# Patient Record
Sex: FEMALE | Employment: FULL TIME | URBAN - METROPOLITAN AREA
[De-identification: names, ages, dates, MRNs, and addresses within clinical notes are randomized per-mention and may not be internally consistent; named-entity substitution may affect disease eponyms.]

---

## 2017-07-12 ENCOUNTER — CLINICAL SUPPORT (OUTPATIENT)
Dept: OCCUPATIONAL MEDICINE | Facility: CLINIC | Age: 47
End: 2017-07-12

## 2017-07-12 DIAGNOSIS — Z02.1 PHYSICAL EXAM, PRE-EMPLOYMENT: Primary | ICD-10-CM

## 2017-07-12 PROCEDURE — 99499 UNLISTED E&M SERVICE: CPT | Mod: S$GLB,,, | Performed by: NURSE PRACTITIONER

## 2021-07-30 LAB
HUMAN PAPILLOMAVIRUS (HPV): NORMAL
PAP RECOMMENDATION EXT: NORMAL
PAP SMEAR: NORMAL

## 2021-08-11 LAB — BCS RECOMMENDATION EXT: NORMAL

## 2022-09-29 ENCOUNTER — DOCUMENTATION ONLY (OUTPATIENT)
Dept: FAMILY MEDICINE | Facility: CLINIC | Age: 52
End: 2022-09-29

## 2024-11-06 ENCOUNTER — HOSPITAL ENCOUNTER (EMERGENCY)
Facility: HOSPITAL | Age: 54
Discharge: HOME | End: 2024-11-07
Attending: EMERGENCY MEDICINE
Payer: MEDICARE

## 2024-11-06 DIAGNOSIS — R07.9 CHEST PAIN, UNSPECIFIED TYPE: ICD-10-CM

## 2024-11-06 DIAGNOSIS — R07.9 CHEST PAIN: ICD-10-CM

## 2024-11-06 DIAGNOSIS — F12.90 MARIJUANA USE: ICD-10-CM

## 2024-11-06 DIAGNOSIS — R41.82 ALTERED MENTAL STATUS, UNSPECIFIED ALTERED MENTAL STATUS TYPE: Primary | ICD-10-CM

## 2024-11-06 LAB
ALBUMIN SERPL BCP-MCNC: 3.9 G/DL (ref 3.5–5.2)
ALLENS TEST: ABNORMAL
ALP SERPL-CCNC: 69 U/L (ref 40–150)
ALT SERPL W/O P-5'-P-CCNC: 20 U/L (ref 10–44)
AMPHET+METHAMPHET UR QL: NEGATIVE
ANION GAP SERPL CALC-SCNC: 10 MMOL/L (ref 8–16)
APAP SERPL-MCNC: <3 UG/ML (ref 10–20)
AST SERPL-CCNC: 24 U/L (ref 10–40)
BACTERIA #/AREA URNS HPF: ABNORMAL /HPF
BARBITURATES UR QL SCN>200 NG/ML: NEGATIVE
BASOPHILS # BLD AUTO: 0.02 K/UL (ref 0–0.2)
BASOPHILS NFR BLD: 0.4 % (ref 0–1.9)
BENZODIAZ UR QL SCN>200 NG/ML: NEGATIVE
BILIRUB SERPL-MCNC: 0.8 MG/DL (ref 0.1–1)
BILIRUB UR QL STRIP: NEGATIVE
BNP SERPL-MCNC: 38 PG/ML (ref 0–99)
BUN SERPL-MCNC: 9 MG/DL (ref 6–20)
BZE UR QL SCN: NEGATIVE
CALCIUM SERPL-MCNC: 9.5 MG/DL (ref 8.7–10.5)
CANNABINOIDS UR QL SCN: ABNORMAL
CHLORIDE SERPL-SCNC: 109 MMOL/L (ref 95–110)
CLARITY UR: ABNORMAL
CO2 SERPL-SCNC: 23 MMOL/L (ref 23–29)
COLOR UR: ABNORMAL
CREAT SERPL-MCNC: 0.9 MG/DL (ref 0.5–1.4)
CREAT UR-MCNC: 188.4 MG/DL (ref 15–325)
DIFFERENTIAL METHOD BLD: ABNORMAL
EOSINOPHIL # BLD AUTO: 0.3 K/UL (ref 0–0.5)
EOSINOPHIL NFR BLD: 5.7 % (ref 0–8)
ERYTHROCYTE [DISTWIDTH] IN BLOOD BY AUTOMATED COUNT: 13.9 % (ref 11.5–14.5)
EST. GFR  (NO RACE VARIABLE): >60 ML/MIN/1.73 M^2
ETHANOL SERPL-MCNC: <10 MG/DL
ETHANOL SERPL-MCNC: <10 MG/DL
GLUCOSE SERPL-MCNC: 99 MG/DL (ref 70–110)
GLUCOSE UR QL STRIP: NEGATIVE
HCO3 UR-SCNC: 25.4 MMOL/L (ref 24–28)
HCT VFR BLD AUTO: 40 % (ref 37–48.5)
HGB BLD-MCNC: 12.2 G/DL (ref 12–16)
HGB UR QL STRIP: ABNORMAL
HYALINE CASTS #/AREA URNS LPF: ABNORMAL /LPF
IMM GRANULOCYTES # BLD AUTO: 0.01 K/UL (ref 0–0.04)
IMM GRANULOCYTES NFR BLD AUTO: 0.2 % (ref 0–0.5)
KETONES UR QL STRIP: NEGATIVE
LEUKOCYTE ESTERASE UR QL STRIP: ABNORMAL
LYMPHOCYTES # BLD AUTO: 2 K/UL (ref 1–4.8)
LYMPHOCYTES NFR BLD: 35.6 % (ref 18–48)
MCH RBC QN AUTO: 26 PG (ref 27–31)
MCHC RBC AUTO-ENTMCNC: 30.5 G/DL (ref 32–36)
MCV RBC AUTO: 85 FL (ref 82–98)
METHADONE UR QL SCN>300 NG/ML: NEGATIVE
MICROSCOPIC COMMENT: ABNORMAL
MONOCYTES # BLD AUTO: 0.6 K/UL (ref 0.3–1)
MONOCYTES NFR BLD: 11.3 % (ref 4–15)
NEUTROPHILS # BLD AUTO: 2.6 K/UL (ref 1.8–7.7)
NEUTROPHILS NFR BLD: 46.8 % (ref 38–73)
NITRITE UR QL STRIP: NEGATIVE
NRBC BLD-RTO: 0 /100 WBC
OPIATES UR QL SCN: NEGATIVE
PCO2 BLDA: 42.4 MMHG (ref 35–45)
PCP UR QL SCN>25 NG/ML: NEGATIVE
PH SMN: 7.39 [PH] (ref 7.35–7.45)
PH UR STRIP: 6 [PH] (ref 5–8)
PLATELET # BLD AUTO: 201 K/UL (ref 150–450)
PMV BLD AUTO: 10.4 FL (ref 9.2–12.9)
PO2 BLDA: 64 MMHG (ref 40–60)
POC BE: 0 MMOL/L
POC SATURATED O2: 92 % (ref 95–100)
POC TCO2: 27 MMOL/L (ref 24–29)
POTASSIUM SERPL-SCNC: 3.3 MMOL/L (ref 3.5–5.1)
PROT SERPL-MCNC: 7.8 G/DL (ref 6–8.4)
PROT UR QL STRIP: ABNORMAL
RBC # BLD AUTO: 4.7 M/UL (ref 4–5.4)
RBC #/AREA URNS HPF: >100 /HPF (ref 0–4)
SAMPLE: ABNORMAL
SITE: ABNORMAL
SODIUM SERPL-SCNC: 142 MMOL/L (ref 136–145)
SP GR UR STRIP: 1.01 (ref 1–1.03)
SQUAMOUS #/AREA URNS HPF: 8 /HPF
TOXICOLOGY INFORMATION: ABNORMAL
TROPONIN I SERPL DL<=0.01 NG/ML-MCNC: <0.006 NG/ML (ref 0–0.03)
TSH SERPL DL<=0.005 MIU/L-ACNC: 0.65 UIU/ML (ref 0.4–4)
URN SPEC COLLECT METH UR: ABNORMAL
UROBILINOGEN UR STRIP-ACNC: NEGATIVE EU/DL
WBC # BLD AUTO: 5.47 K/UL (ref 3.9–12.7)
WBC #/AREA URNS HPF: 1 /HPF (ref 0–5)

## 2024-11-06 PROCEDURE — 80143 DRUG ASSAY ACETAMINOPHEN: CPT | Performed by: EMERGENCY MEDICINE

## 2024-11-06 PROCEDURE — 83880 ASSAY OF NATRIURETIC PEPTIDE: CPT | Performed by: EMERGENCY MEDICINE

## 2024-11-06 PROCEDURE — 82077 ASSAY SPEC XCP UR&BREATH IA: CPT | Performed by: EMERGENCY MEDICINE

## 2024-11-06 PROCEDURE — 80307 DRUG TEST PRSMV CHEM ANLYZR: CPT | Performed by: EMERGENCY MEDICINE

## 2024-11-06 PROCEDURE — 82803 BLOOD GASES ANY COMBINATION: CPT

## 2024-11-06 PROCEDURE — 81000 URINALYSIS NONAUTO W/SCOPE: CPT | Mod: 59 | Performed by: EMERGENCY MEDICINE

## 2024-11-06 PROCEDURE — 93010 ELECTROCARDIOGRAM REPORT: CPT | Mod: ,,, | Performed by: INTERNAL MEDICINE

## 2024-11-06 PROCEDURE — 85025 COMPLETE CBC W/AUTO DIFF WBC: CPT | Performed by: EMERGENCY MEDICINE

## 2024-11-06 PROCEDURE — 84484 ASSAY OF TROPONIN QUANT: CPT | Performed by: EMERGENCY MEDICINE

## 2024-11-06 PROCEDURE — 93005 ELECTROCARDIOGRAM TRACING: CPT

## 2024-11-06 PROCEDURE — 99900035 HC TECH TIME PER 15 MIN (STAT)

## 2024-11-06 PROCEDURE — 96374 THER/PROPH/DIAG INJ IV PUSH: CPT

## 2024-11-06 PROCEDURE — 63600175 PHARM REV CODE 636 W HCPCS: Performed by: EMERGENCY MEDICINE

## 2024-11-06 PROCEDURE — 99285 EMERGENCY DEPT VISIT HI MDM: CPT | Mod: 25

## 2024-11-06 PROCEDURE — 80053 COMPREHEN METABOLIC PANEL: CPT | Performed by: EMERGENCY MEDICINE

## 2024-11-06 PROCEDURE — 84443 ASSAY THYROID STIM HORMONE: CPT | Performed by: EMERGENCY MEDICINE

## 2024-11-06 PROCEDURE — 96375 TX/PRO/DX INJ NEW DRUG ADDON: CPT

## 2024-11-06 RX ORDER — ONDANSETRON HYDROCHLORIDE 2 MG/ML
4 INJECTION, SOLUTION INTRAVENOUS
Status: COMPLETED | OUTPATIENT
Start: 2024-11-06 | End: 2024-11-06

## 2024-11-06 RX ADMIN — ONDANSETRON 4 MG: 2 INJECTION INTRAMUSCULAR; INTRAVENOUS at 09:11

## 2024-11-07 ENCOUNTER — HOSPITAL ENCOUNTER (EMERGENCY)
Facility: HOSPITAL | Age: 54
Discharge: LEFT BEFORE DISCHARGE | End: 2024-11-07
Attending: EMERGENCY MEDICINE
Payer: MEDICARE

## 2024-11-07 VITALS
SYSTOLIC BLOOD PRESSURE: 183 MMHG | HEART RATE: 93 BPM | HEIGHT: 64 IN | RESPIRATION RATE: 20 BRPM | OXYGEN SATURATION: 96 % | DIASTOLIC BLOOD PRESSURE: 85 MMHG | TEMPERATURE: 98 F | WEIGHT: 270 LBS | BODY MASS INDEX: 46.1 KG/M2

## 2024-11-07 VITALS
RESPIRATION RATE: 16 BRPM | TEMPERATURE: 98 F | OXYGEN SATURATION: 97 % | DIASTOLIC BLOOD PRESSURE: 79 MMHG | HEART RATE: 99 BPM | SYSTOLIC BLOOD PRESSURE: 175 MMHG | WEIGHT: 268.94 LBS | BODY MASS INDEX: 42.21 KG/M2 | HEIGHT: 67 IN

## 2024-11-07 DIAGNOSIS — I10 HYPERTENSION: ICD-10-CM

## 2024-11-07 DIAGNOSIS — R44.3 HALLUCINATIONS: Primary | ICD-10-CM

## 2024-11-07 DIAGNOSIS — F43.21 GRIEF REACTION: ICD-10-CM

## 2024-11-07 LAB
ALBUMIN SERPL-MCNC: 4.1 G/DL (ref 3.3–5.5)
ALBUMIN SERPL-MCNC: 4.2 G/DL (ref 3.3–5.5)
ALP SERPL-CCNC: 56 U/L (ref 42–141)
ALP SERPL-CCNC: 65 U/L (ref 42–141)
BILIRUB SERPL-MCNC: 0.7 MG/DL (ref 0.2–1.6)
BILIRUB SERPL-MCNC: 0.8 MG/DL (ref 0.2–1.6)
BUN SERPL-MCNC: 10 MG/DL (ref 7–22)
CALCIUM SERPL-MCNC: 9.8 MG/DL (ref 8–10.3)
CHLORIDE SERPL-SCNC: 107 MMOL/L (ref 98–108)
CREAT SERPL-MCNC: 0.9 MG/DL (ref 0.6–1.2)
GLUCOSE SERPL-MCNC: 118 MG/DL (ref 73–118)
OHS QRS DURATION: 120 MS
OHS QTC CALCULATION: 523 MS
POC ALT (SGPT): 18 U/L (ref 10–47)
POC ALT (SGPT): 18 U/L (ref 10–47)
POC AMYLASE: 45 U/L (ref 14–97)
POC AST (SGOT): 30 U/L (ref 11–38)
POC AST (SGOT): 32 U/L (ref 11–38)
POC B-TYPE NATRIURETIC PEPTIDE: 156 PG/ML (ref 0–100)
POC CARDIAC TROPONIN I: 0 NG/ML (ref 0–0.08)
POC GGT: 32 U/L (ref 5–65)
POC TCO2: 25 MMOL/L (ref 18–33)
POTASSIUM BLD-SCNC: 3.5 MMOL/L (ref 3.6–5.1)
PROTEIN, POC: 7.4 G/DL (ref 6.4–8.1)
PROTEIN, POC: 7.8 G/DL (ref 6.4–8.1)
SAMPLE: NORMAL
SODIUM BLD-SCNC: 141 MMOL/L (ref 128–145)

## 2024-11-07 PROCEDURE — 99283 EMERGENCY DEPT VISIT LOW MDM: CPT | Mod: ER

## 2024-11-07 PROCEDURE — 84484 ASSAY OF TROPONIN QUANT: CPT | Mod: ER

## 2024-11-07 PROCEDURE — 82040 ASSAY OF SERUM ALBUMIN: CPT | Mod: ER

## 2024-11-07 PROCEDURE — 83880 ASSAY OF NATRIURETIC PEPTIDE: CPT | Mod: ER

## 2024-11-07 PROCEDURE — 80053 COMPREHEN METABOLIC PANEL: CPT | Mod: ER

## 2024-11-07 PROCEDURE — 82803 BLOOD GASES ANY COMBINATION: CPT | Mod: ER

## 2024-11-07 PROCEDURE — 63600175 PHARM REV CODE 636 W HCPCS: Performed by: STUDENT IN AN ORGANIZED HEALTH CARE EDUCATION/TRAINING PROGRAM

## 2024-11-07 RX ORDER — QUETIAPINE FUMARATE 50 MG/1
1 TABLET, FILM COATED ORAL NIGHTLY
COMMUNITY
Start: 2024-10-30 | End: 2024-11-29

## 2024-11-07 RX ORDER — BUPROPION HYDROCHLORIDE 300 MG/1
300 TABLET ORAL EVERY MORNING
COMMUNITY
Start: 2024-10-04

## 2024-11-07 RX ORDER — FAMOTIDINE 10 MG/ML
40 INJECTION INTRAVENOUS
Status: DISCONTINUED | OUTPATIENT
Start: 2024-11-07 | End: 2024-11-07 | Stop reason: HOSPADM

## 2024-11-07 RX ORDER — CARVEDILOL 25 MG/1
25 TABLET ORAL 2 TIMES DAILY
COMMUNITY

## 2024-11-07 RX ORDER — LOSARTAN POTASSIUM 100 MG/1
1 TABLET ORAL DAILY
COMMUNITY
Start: 2024-01-04 | End: 2025-01-03

## 2024-11-07 RX ORDER — FAMOTIDINE 20 MG/1
1 TABLET, FILM COATED ORAL 2 TIMES DAILY
COMMUNITY
Start: 2023-12-05 | End: 2024-12-04

## 2024-11-07 RX ORDER — DROPERIDOL 2.5 MG/ML
1.25 INJECTION, SOLUTION INTRAMUSCULAR; INTRAVENOUS ONCE
Status: DISCONTINUED | OUTPATIENT
Start: 2024-11-07 | End: 2024-11-07

## 2024-11-07 RX ORDER — SPIRONOLACTONE 25 MG/1
1 TABLET ORAL DAILY
COMMUNITY
Start: 2023-12-21

## 2024-11-07 RX ORDER — ONDANSETRON HYDROCHLORIDE 2 MG/ML
8 INJECTION, SOLUTION INTRAVENOUS
Status: DISCONTINUED | OUTPATIENT
Start: 2024-11-07 | End: 2024-11-07 | Stop reason: HOSPADM

## 2024-11-07 RX ORDER — LORAZEPAM 2 MG/ML
1 INJECTION INTRAMUSCULAR
Status: COMPLETED | OUTPATIENT
Start: 2024-11-07 | End: 2024-11-07

## 2024-11-07 RX ADMIN — LORAZEPAM 1 MG: 2 INJECTION INTRAMUSCULAR; INTRAVENOUS at 02:11

## 2024-11-07 NOTE — ED PROVIDER NOTES
Encounter Date: 2024       History     Chief Complaint   Patient presents with    Chest Pain     Pt reported to ED with a CC of CP. PT reported that she felt her defibrillator go off, and believed that she was having a heart attack.     HPI    54-year-old female past medical history of hypertension, depression presents with chest pain since earlier today.  Patient reports being at the hairdresser when she felt like she was getting shocked by her defibrillator.  She reports feeling like she is having a heart attack.  She reports having no headache.  Son reports that she saw her psychiatrist earlier this morning and was told to double her Seroquel dosing.  She reports taking this earlier this morning.  Son reports that she was talking like normal and acting normally earlier today.  She reports her son's  is tomorrow morning at 10:00 a.m..  She wants to have a stethoscope placed on her chest as she reports having a heart attack currently.  She denies any headache, shortness of breath, or any further complaints.    Review of patient's allergies indicates:  No Known Allergies  Past Medical History:   Diagnosis Date    Hypertension      Past Surgical History:   Procedure Laterality Date    KNEE SURGERY      NASAL SEPTUM SURGERY      TONSILLECTOMY       Family History   Problem Relation Name Age of Onset    Breast cancer Mother      Colon cancer Neg Hx      Ovarian cancer Neg Hx       Social History     Tobacco Use    Smoking status: Never    Smokeless tobacco: Never     Review of Systems   Constitutional: Negative.    HENT: Negative.     Eyes: Negative.    Respiratory: Negative.     Cardiovascular:  Positive for chest pain.   Gastrointestinal: Negative.    Genitourinary: Negative.    Musculoskeletal: Negative.    Skin: Negative.    Neurological: Negative.    Psychiatric/Behavioral:  Positive for agitation.        Physical Exam     Initial Vitals [24 1924]   BP Pulse Resp Temp SpO2   (!) 184/90 90 18 97.3  °F (36.3 °C) 99 %      MAP       --         Physical Exam    Nursing note and vitals reviewed.  Constitutional: She is not diaphoretic. She appears distressed (moderately, moving all over the bed, yelling).   HENT:   Head: Normocephalic and atraumatic.   Nose: Nose normal.   Eyes: EOM are normal. Pupils are equal, round, and reactive to light.   Neck: Neck supple. No JVD present.   Normal range of motion.  Cardiovascular:  Regular rhythm, normal heart sounds and intact distal pulses.           Tachycardic   Pulmonary/Chest: No stridor. She is in respiratory distress (Mild tachypnea). She has no wheezes. She has no rhonchi. She has no rales.   AICD to left chest wall   Abdominal: Abdomen is soft. Bowel sounds are normal. She exhibits no distension. There is no abdominal tenderness.   Musculoskeletal:         General: No tenderness or edema. Normal range of motion.      Cervical back: Normal range of motion and neck supple.     Neurological: She is alert and oriented to person, place, and time. She has normal strength.   Skin: Skin is warm and dry. Capillary refill takes less than 2 seconds. No rash noted. No erythema.         ED Course   Procedures  Labs Reviewed   CBC W/ AUTO DIFFERENTIAL - Abnormal       Result Value    WBC 5.47      RBC 4.70      Hemoglobin 12.2      Hematocrit 40.0      MCV 85      MCH 26.0 (*)     MCHC 30.5 (*)     RDW 13.9      Platelets 201      MPV 10.4      Immature Granulocytes 0.2      Gran # (ANC) 2.6      Immature Grans (Abs) 0.01      Lymph # 2.0      Mono # 0.6      Eos # 0.3      Baso # 0.02      nRBC 0      Gran % 46.8      Lymph % 35.6      Mono % 11.3      Eosinophil % 5.7      Basophil % 0.4      Differential Method Automated     COMPREHENSIVE METABOLIC PANEL - Abnormal    Sodium 142      Potassium 3.3 (*)     Chloride 109      CO2 23      Glucose 99      BUN 9      Creatinine 0.9      Calcium 9.5      Total Protein 7.8      Albumin 3.9      Total Bilirubin 0.8      Alkaline  Phosphatase 69      AST 24      ALT 20      eGFR >60      Anion Gap 10     DRUG SCREEN PANEL, URINE EMERGENCY - Abnormal    Benzodiazepines Negative      Methadone metabolites Negative      Cocaine (Metab.) Negative      Opiate Scrn, Ur Negative      Barbiturate Screen, Ur Negative      Amphetamine Screen, Ur Negative      THC Presumptive Positive (*)     Phencyclidine Negative      Creatinine, Urine 188.4      Toxicology Information SEE COMMENT      Narrative:     Specimen Source->Urine   URINALYSIS, REFLEX TO URINE CULTURE - Abnormal    Specimen UA Urine, Clean Catch      Color, UA Orange (*)     Appearance, UA Hazy (*)     pH, UA 6.0      Specific Gravity, UA 1.015      Protein, UA 1+ (*)     Glucose, UA Negative      Ketones, UA Negative      Bilirubin (UA) Negative      Occult Blood UA 3+ (*)     Nitrite, UA Negative      Urobilinogen, UA Negative      Leukocytes, UA 1+ (*)     Narrative:     Specimen Source->Urine   ACETAMINOPHEN LEVEL - Abnormal    Acetaminophen (Tylenol), Serum <3.0 (*)    URINALYSIS MICROSCOPIC - Abnormal    RBC, UA >100 (*)     WBC, UA 1      Bacteria Rare      Squam Epithel, UA 8      Hyaline Casts, UA none      Microscopic Comment SEE COMMENT      Narrative:     Specimen Source->Urine   ISTAT PROCEDURE - Abnormal    POC PH 7.386      POC PCO2 42.4      POC PO2 64 (*)     POC HCO3 25.4      POC BE 0      POC SATURATED O2 92      POC TCO2 27      Sample VENOUS      Site Other      Allens Test N/A     TROPONIN I    Troponin I <0.006     B-TYPE NATRIURETIC PEPTIDE    BNP 38     ALCOHOL,MEDICAL (ETHANOL)    Alcohol, Serum <10     TSH    TSH 0.649     ALCOHOL,MEDICAL (ETHANOL)    Alcohol, Serum <10            Imaging Results              CT Head Without Contrast (Final result)  Result time 11/06/24 23:08:18      Final result by Jaye Dumont MD (11/06/24 23:08:18)                   Impression:      No acute intracranial abnormality detected.      Electronically signed by: Jaye  Tim  Date:    11/06/2024  Time:    23:08               Narrative:    EXAMINATION:  CT OF THE HEAD WITHOUT    CLINICAL HISTORY:  Chest pain.Mental status change, unknown cause;    TECHNIQUE:  5 mm unenhanced axial images were obtained from the skull base to the vertex.    COMPARISON:  11/06/2024 22:21    FINDINGS:  The ventricles, basal cisterns, and cortical sulci are within normal limits for patient's stated age. There is no acute intracranial hemorrhage, territorial infarct or mass effect, or midline shift. The visualized paranasal sinuses and mastoid air cells are clear.  There is hyperostosis frontalis.                                       X-Ray Chest AP Portable (Final result)  Result time 11/06/24 21:57:39      Final result by Abraham Alejandro MD (11/06/24 21:57:39)                   Impression:      Cardiomegaly with suspected small bilateral pleural effusions and pulmonary vascular congestion, suggestive of mild pulmonary edema secondary to CHF.      Electronically signed by: Abraham Alejandro MD  Date:    11/06/2024  Time:    21:57               Narrative:    EXAMINATION:  XR CHEST AP PORTABLE    CLINICAL HISTORY:  Chest Pain;    TECHNIQUE:  Single frontal view of the chest was performed.    COMPARISON:  02/09/2009.    FINDINGS:  There is a multilead left-sided AICD in place.  The appearance is unremarkable.  Monitoring EKG leads are present.    The trachea is unremarkable.  The cardiac silhouette is enlarged.  There is elevation of both hemidiaphragms.  There are suspected small bilateral pleural effusions.  There is no evidence of a pneumothorax.  There is no evidence of pneumomediastinum.  There is mild pulmonary vascular congestion.  There is no focal consolidation.  There are degenerative changes in the osseous structures.                                       Medications   ondansetron injection 4 mg (4 mg Intravenous Given 11/6/24 2131)   LORazepam injection 1 mg (1 mg Intravenous Given 11/7/24 0216)      Medical Decision Making             ED Course as of 24 0420   u 2024   0018 I assumed care of this patient pending workup for altered mental status.  She initially presented complaining of chest pain feeling like she was shocked by her defibrillator.  Chest pain workup was reassuring including 2 troponins, EKG, chest x-ray, BNP and device check.  On reexamination, she was confused and then her son provided information that she was not acting like herself.  Altered mental status workup subsequently initiated just prior to my assumption of care of this patient.  On my exam, the patient is alert to self, disoriented to location and situation, following conversation but intermittently making strange gestures with horizontal nystagmus.  Vitals were within normal limits.  Brought her workup included CT head without acute intracranial abnormality, ethanol level which was normal, VBG without evidence of acidosis, UA with red blood cells and drug screen notable for marijuana.  Presentation certainly could be consistent with marijuana intoxication.  After further examination, discuss case with patient's brother, at bedside as well as her son, and they would like to take the patient home.  I attempted to ambulate the patient, but she would not walk under her own assistance, she was also unable to clearly state what happened today.  For that reason, decided to keep her in the emergency department and observe her until the morning, hopefully I would be able to discharge her into the care of her family prior to her son's  at 10:00 a.m..  Subsequently, patient had generalized tonic-clonic movement, brief apneic episode, most consistent with seizure.  She has no history of seizures.  She had mild postictal state, followed by normal blood sugar, and I will continue to monitor closely.  Patient is altered, but has no neck pain, no signs of meningitis. If she has prolonged altered mental status or another  seizure, likely will have to admit the patient to the hospital.  I explained that to the patient's brother and son, who expressed understanding.  [BS]   0416 Patient now able to ambulate without difficulty, she was clinically sober.  She endorsed taking a marijuana edible earlier, reporting she started taking them after her son has passed.  I advised that she should discontinue taking those given the current hospitalization and prolonged altered mental status.  She agreed.  Spoke to her son, who will pick her up.  She was stable for discharge with outpatient follow up and cessation of marijuana. [BS]      ED Course User Index  [BS] Pako Orr MD         MDM:    54-year-old female past medical history of hypertension, depression presents with chest pain since earlier today.  Differential Diagnosis includes:  Altered mental status intoxication, medication side-effect, grief reaction.  Physical exam as noted above.  ED workup notable for EKG showing normal sinus rhythm rate of 91 beats per minute, nonspecific ST and T-wave changes noted throughout,  MS, no STEMI.  Troponin negative, BNP 38, alcohol negative, chest x-ray is unremarkable, CMP with potassium 3.3, creatinine 0.9, CBC white blood cell count 5.47, hemoglobin 12.2.  Patient presentation initially concerning for chest pain, reporting that she got shocked.  [employer] interrogated and had no defibrillation present, no arrhythmias or any abnormalities were noted.  Patient reassessed and still appears to be verbose, slightly agitated and stating that she she is having chest pain and to check her foot.  Upon arrival of the son he reports that she was conversing like normal, acting normally earlier today prior to her going to get her hair done at the [employer].  He is unclear if she is taking any additional medications or substances.  But she is currently not acting herself.  Additional lab work was added, CT head was added, tele psychiatry  consulted.       Note was created using voice recognition software. Note may have occasional typographical or grammatical errors, garbled syntax, and other bizarre constructions that may not have been identified and edited despite good lobo initial review prior to signing.                           Clinical Impression:  Final diagnoses:  [R07.9] Chest pain  [R07.9] Chest pain, unspecified type  [F12.90] Marijuana use  [R41.82] Altered mental status, unspecified altered mental status type (Primary)                 Pako Orr MD  11/07/24 [id number]

## 2024-11-07 NOTE — ED NOTES
Pt reported to ED with a CC of CP. PT reported that she felt her defibrillator go off, and believed that she was having a heart attack.

## 2024-11-07 NOTE — ED NOTES
Upon entering room pt rigid and stiff seizure like activity, pt apneic with pulses intact.  Nonrebreather mask placed.  Episode lasted for approximately 2 minutes. POCT Glucose 123.   Nurse and PA at bedside. Pt now able to verbalize that she need a nap. Monitoring continued.

## 2024-11-07 NOTE — ED NOTES
Pt able to ambulate to restroom with assistance, in distress noted. Son at bedside willing to take her home. Pt and family educated on home care.

## 2024-11-07 NOTE — LETTER
Patient: Slade Bernal  YOB: 1970  Date: 11/7/2024 Time: 7:42 AM  Location: [facility]    Leaving the Hospital Against Medical Advice    Chart #:[id number]    This will certify that I, the undersigned,    ______________________________________________________________________    A patient in the above named medical center, having requested discharge and removal from the medical center against the advice of my attending physician(s), hereby release [facility], its physicians, officers and employees, severally and individually, from any and all liability of any nature whatsoever for any injury or harm or complication of any kind that may result directly or indirectly, by reason of my terminating my stay as a patient at [facility] and my departure from [facility], and hereby waive any and all rights of action I may now have or later acquire as a result of my voluntary departure from [facility] and the termination of my stay as a patient therein.    This release is made with the full knowledge of the danger that may result from the action which I am taking.      Date:_______________________                         ___________________________                                                                                    Patient/Legal Representative    Witness:        ____________________________                          ___________________________  Nurse                                                                        Physician

## 2024-11-07 NOTE — ED NOTES
Pt's [employer] Pacemaker/Defibrillator interpreted.  Per [employer] rep, NO SHOCKS noted, no pacing needed, and device appears to be working completely normal.  MD notified and is at bedside updating patient and family.

## 2024-11-07 NOTE — ED PROVIDER NOTES
Encounter Date: 2024    SCRIBE #1 NOTE: I, Ezekiel Khan, am scribing for, and in the presence of,  Stella Mathew DO. I have scribed the following portions of the note - Other sections scribed: HPI,ROS,PE.       History     Chief Complaint   Patient presents with    Drug Overdose    Hallucinations     PT SON REPORTS PT TOOK AN EDDIBLE LAST NIGHT, AND WAS SEEN AT [facility], WAS TREATED AND DISCHARGED, PT NOW HALLUCINATING AND NOT ACTING RIGHT PER SON. UNK IF FURTHER INGESTION OCCURRED     Vera Bernal is a 54 y.o. female with Hx of HTN and depression, who presents to the ED for chief complaint of hallucinations onset 1 day ago. Patient reports associated symptoms of nausea and vomiting. Independent Historian: Patient's son reports that the patient was seen at [facility] ED on  1 day ago for chest pain, patient reports feeling shocking sensations to her chest which she thinks was caused by her defibrillator. Patient reports she also saw her psychiatrist 1 day ago who instructed her to double her dose of Seroquel, patient endorses doubling the dose and taking a THC edible. Patient was discharged from the ED 3 hours ago so that she could go to her sons (today at 10 am)and Patient's son reports that once home, the patient drank water and began vomiting, prompting them to come to this ED. Per Patient's son, en route to the ED, the patient was jumping in the car because she was seeing cars hitting their car and is actively hallucinating during exam. Patient's son denies the patient taking any medication today.            The history is provided by a relative and the patient. No  was used.     Review of patient's allergies indicates:  No Known Allergies  Past Medical History:   Diagnosis Date    Depression     Hypertension      Past Surgical History:   Procedure Laterality Date    KNEE SURGERY      NASAL SEPTUM SURGERY      TONSILLECTOMY       Family History   Problem Relation  Name Age of Onset    Breast cancer Mother      Colon cancer Neg Hx      Ovarian cancer Neg Hx       Social History     Tobacco Use    Smoking status: Never    Smokeless tobacco: Never   Substance Use Topics    Drug use: Yes     Types: Other-see comments     Comment: Edible     Review of Systems   Constitutional:  Negative for fever.   HENT:  Negative for rhinorrhea and sore throat.    Eyes:  Negative for redness.   Respiratory:  Negative for shortness of breath.    Cardiovascular:  Negative for chest pain and leg swelling.   Gastrointestinal:  Positive for nausea and vomiting. Negative for abdominal pain and diarrhea.   Musculoskeletal:  Negative for back pain.   Skin:  Negative for rash.   Neurological:  Negative for syncope and headaches.   Psychiatric/Behavioral:  Positive for hallucinations.    All other systems reviewed and are negative.      Physical Exam     Initial Vitals [11/07/24 0714]   BP Pulse Resp Temp SpO2   (!) 189/102 94 20 98.2 °F (36.8 °C) 96 %      MAP       --         Patient gave consent to have physical exam performed.   Physical Exam    Nursing note and vitals reviewed.  Constitutional: She appears well-developed and well-nourished.   Patient is covered in vomit during exam.    HENT:   Head: Normocephalic and atraumatic.   Right Ear: External ear normal.   Left Ear: External ear normal.   Nose: Nose normal. Mouth/Throat: Oropharynx is clear and moist.   Eyes: Conjunctivae and EOM are normal. Pupils are equal, round, and reactive to light.   Neck: Phonation normal. Neck supple.   Normal range of motion.  Cardiovascular:  Normal rate, regular rhythm, normal heart sounds and intact distal pulses.     Exam reveals no gallop and no friction rub.       No murmur heard.  Pulmonary/Chest: Effort normal and breath sounds normal. No stridor. No respiratory distress. She has no wheezes. She has no rhonchi. She has no rales. She exhibits no tenderness.   Abdominal: Abdomen is soft. Bowel sounds are  normal. She exhibits no distension. There is no abdominal tenderness. There is no rigidity, no rebound and no guarding.   Musculoskeletal:         General: No tenderness or edema. Normal range of motion.      Cervical back: Normal range of motion and neck supple.     Neurological: She is alert and oriented to person, place, and time. She has normal strength. No cranial nerve deficit or sensory deficit. GCS score is 15. GCS eye subscore is 4. GCS verbal subscore is 5. GCS motor subscore is 6.   Skin: Skin is warm and dry. Capillary refill takes less than 2 seconds. No rash noted.   Psychiatric: She has a normal mood and affect. Her behavior is normal. She is actively hallucinating.   Patient is actively hallucinating during exam. She is aware of person but not place or time.         ED Course   Critical Care    Date/Time: 11/7/2024 8:12 AM    Performed by: Stella Mathew DO  Authorized by: Stella Mathew DO  Direct patient critical care time: 8 minutes  Additional history critical care time: 8 minutes  Ordering / reviewing critical care time: 8 minutes  Documentation critical care time: 8 minutes  Consult with family critical care time: 8 minutes  Total critical care time (exclusive of procedural time) : 40 minutes  Critical care was necessary to treat or prevent imminent or life-threatening deterioration of the following conditions: toxidrome (Hallucinations with possible toxidrome).  Critical care was time spent personally by me on the following activities: evaluation of patient's response to treatment, examination of patient, obtaining history from patient or surrogate, pulse oximetry, re-evaluation of patient's condition and review of old charts.        Labs Reviewed   POCT CMP - Abnormal       Result Value    Albumin, POC 4.1      Alkaline Phosphatase, POC 56      ALT (SGPT), POC 18      AST (SGOT), POC 32      POC BUN 10      Calcium, POC 9.8      POC Chloride 107      POC Creatinine 0.9      POC Glucose 118       POC Potassium 3.5 (*)     POC Sodium 141      Bilirubin, POC 0.7      POC TCO2 25      Protein, POC 7.4     POCT B-TYPE NATRIURETIC PEPTIDE (BNP) - Abnormal    POC B-Type Natriuretic Peptide 156 (*)    TROPONIN ISTAT    POC Cardiac Troponin I 0.00      Sample unknown     POCT CBC   POCT GLUCOSE, HAND-HELD DEVICE   POCT URINALYSIS W/O SCOPE   POCT LIVER PANEL    Albumin, POC 4.2      Alkaline Phosphatase, POC 65      ALT (SGPT), POC 18      Amylase, POC 45      AST (SGOT), POC 30      POC GGT 32      Bilirubin, POC 0.8      Protein, POC 7.8     POCT CMP   POCT PROTIME-INR   POCT TROPONIN   POCT B-TYPE NATRIURETIC PEPTIDE (BNP)   POCT LIVER PANEL          Imaging Results    None          Medications   famotidine (PF) injection 40 mg (has no administration in time range)   ondansetron injection 8 mg (has no administration in time range)     Medical Decision Making  After the initial assessment, patient's son left and the patient's daughter arrived. She states that she has been a nurse for 25 years and that her Mother is ok to leave AMA, she reports that the patient is at baseline and that she does not want her to regret missing her son's .     Amount and/or Complexity of Data Reviewed  Labs: ordered.  Radiology: ordered.    Risk  Prescription drug management.    Medical Decision Making:    This is an evaluation of a 54 y.o. female that presents to the Emergency Department for   Chief Complaint   Patient presents with    Drug Overdose    Hallucinations     PT SON REPORTS PT TOOK AN EDDIBLE LAST NIGHT, AND WAS SEEN AT [facility], WAS TREATED AND DISCHARGED, PT NOW HALLUCINATING AND NOT ACTING RIGHT PER SON. UNK IF FURTHER INGESTION OCCURRED       Independent historian: Patient's son     The patient is a non-toxic and well appearing patient. On physical exam, patient appears well hydrated with moist mucus membranes. Breath sounds are clear and equal bilaterally with no adventitious breath sounds, tachypnea or  respiratory distress. Regular rate and rhythm. No murmurs. Abdomen soft and non tender. Patient is tolerating PO without difficulty. Physical exam otherwise as above.     I have reviewed vital signs and nursing notes.   Vital Signs Are Reassuring.     Based on the patient's symptoms, I am considering and evaluating for the following differential diagnoses: psychosis, hallucinations, grief reaction, HTN, uncontrolled HTN, hyperglycemia or hypoglycemia.    Consider hospitalization for:  Chest pain, hallucinations, and acute grief reaction    Patient is NOT  agreeable to transfer and admission to any hospital.    ED Course:Treatment in the ED included Physical Exam and medications given in ED  Medications   famotidine (PF) injection 40 mg (has no administration in time range)   ondansetron injection 8 mg (has no administration in time range)   .   Patient reports feeling better after treatment in the ER.       External Data/Documents Reviewed: Previous medical records and vital signs reviewed, see HPI and Physical exam.   Labs: ordered and reviewed.  All care refused and evaluation by patient and daughter at bedside.  Radiology: ordered as indicated and reviewed. All care and evaluation refused by patient and daughter at bedside  EKG refused by patient and daughter.  Cardiac monitor placed for AMS. Monitor shows Normal Sinus Rhythm with  rate of 88. Interpreted by Dr. Stella Mathew DO.    Risk  Diagnosis or treatment significantly limited by the following social determinants of health: Body mass index is 46.35 kg/m².     In shared decision making with the patient and daughter, we discussed recommended treatment, prescriptions, labs, and imaging results.  All care was declined including tele psych evaluation.    Date: 11/7/2024  Patient: Vera Bernal  Admitted: 11/7/2024 7:10 AM  Attending Provider: Stella Mathew DO    Vera Bernal or her authorized caregiver has made the decision for the patient to leave the  "emergency department against the advice of the emergency department staff. She or her authorized caregiver has been informed and understands the inherent risks, including death, disability, permanent disability, worsening condition, further injury, and worsening symptoms. She or her authorized caregiver has decided to accept the responsibility for this decision. Vera Bernal and all necessary parties have been advised that she may return for further evaluation or treatment. Her condition at time of discharge was awake alert oriented times self stating she wants to go to the . Vera Bernal had current vital signs as follows:  BP (!) 183/85  Pulse 93  Temp 98.2 °F (36.8 °C) (Oral)  Resp 20  Ht 5' 4" (1.626 m)  Wt 122.5 kg (270 lb)  LMP . Patient and her daughter signed AMA form. Patient's daughter states she is taking personal responsibility for her mother so she can go to the .        At time of AMA patient is awake alert oriented x4 speaking clearly in full sentences and moving all 4 extremities.     The following labs and imaging were reviewed:      Admission on 2024, Discharged on 2024   Component Date Value Ref Range Status    Albumin, POC 2024 4.2  3.3 - 5.5 g/dL Final    Alkaline Phosphatase, POC 2024 65  42 - 141 U/L Final    ALT (SGPT), POC 2024 18  10 - 47 U/L Final    Amylase, POC 2024 45  14 - 97 U/L Final    AST (SGOT), POC 2024 30  11 - 38 U/L Final    POC GGT 2024 32  5 - 65 U/L Final    Bilirubin, POC 2024 0.8  0.2 - 1.6 mg/dL Final    Protein, POC 2024 7.8  6.4 - 8.1 g/dL Final    Albumin, POC 2024 4.1  3.3 - 5.5 g/dL Final    Alkaline Phosphatase, POC 2024 56  42 - 141 U/L Final    ALT (SGPT), POC 2024 18  10 - 47 U/L Final    AST (SGOT), POC 2024 32  11 - 38 U/L Final    POC BUN 2024 10  7 - 22 mg/dL Final    Calcium, POC 2024 9.8  8.0 - 10.3 mg/dL Final    POC Chloride " 11/07/2024 107  98 - 108 mmol/L Final    POC Creatinine 11/07/2024 0.9  0.6 - 1.2 mg/dL Final    POC Glucose 11/07/2024 118  73 - 118 mg/dL Final    POC Potassium 11/07/2024 3.5 (L)  3.6 - 5.1 mmol/L Final    POC Sodium 11/07/2024 141  128 - 145 mmol/L Final    Bilirubin, POC 11/07/2024 0.7  0.2 - 1.6 mg/dL Final    POC TCO2 11/07/2024 25  18 - 33 mmol/L Final    Protein, POC 11/07/2024 7.4  6.4 - 8.1 g/dL Final    POC Cardiac Troponin I 11/07/2024 0.00  0.00 - 0.08 ng/mL Final    Sample 11/07/2024 unknown   Final    Comment: A single negative troponin is insufficient to rule out myocardial infarction.  The use of a serial sampling protocol is recommended practice. Correlate results with reference intervals established for methodology used. Point of care and core laboratory   troponin results are not interchangeable.      POC B-Type Natriuretic Peptide 11/07/2024 156 (H)  0.0 - 100.0 pg/mL Final   Admission on 11/06/2024, Discharged on 11/07/2024   Component Date Value Ref Range Status    WBC 11/06/2024 5.47  3.90 - 12.70 K/uL Final    RBC 11/06/2024 4.70  4.00 - 5.40 M/uL Final    Hemoglobin 11/06/2024 12.2  12.0 - 16.0 g/dL Final    Hematocrit 11/06/2024 40.0  37.0 - 48.5 % Final    MCV 11/06/2024 85  82 - 98 fL Final    MCH 11/06/2024 26.0 (L)  27.0 - 31.0 pg Final    MCHC 11/06/2024 30.5 (L)  32.0 - 36.0 g/dL Final    RDW 11/06/2024 13.9  11.5 - 14.5 % Final    Platelets 11/06/2024 201  150 - 450 K/uL Final    MPV 11/06/2024 10.4  9.2 - 12.9 fL Final    Immature Granulocytes 11/06/2024 0.2  0.0 - 0.5 % Final    Gran # (ANC) 11/06/2024 2.6  1.8 - 7.7 K/uL Final    Immature Grans (Abs) 11/06/2024 0.01  0.00 - 0.04 K/uL Final    Comment: Mild elevation in immature granulocytes is non specific and   can be seen in a variety of conditions including stress response,   acute inflammation, trauma and pregnancy. Correlation with other   laboratory and clinical findings is essential.      Lymph # 11/06/2024 2.0  1.0 - 4.8  K/uL Final    Mono # 11/06/2024 0.6  0.3 - 1.0 K/uL Final    Eos # 11/06/2024 0.3  0.0 - 0.5 K/uL Final    Baso # 11/06/2024 0.02  0.00 - 0.20 K/uL Final    nRBC 11/06/2024 0  0 /100 WBC Final    Gran % 11/06/2024 46.8  38.0 - 73.0 % Final    Lymph % 11/06/2024 35.6  18.0 - 48.0 % Final    Mono % 11/06/2024 11.3  4.0 - 15.0 % Final    Eosinophil % 11/06/2024 5.7  0.0 - 8.0 % Final    Basophil % 11/06/2024 0.4  0.0 - 1.9 % Final    Differential Method 11/06/2024 Automated   Final    Sodium 11/06/2024 142  136 - 145 mmol/L Final    Potassium 11/06/2024 3.3 (L)  3.5 - 5.1 mmol/L Final    Chloride 11/06/2024 109  95 - 110 mmol/L Final    CO2 11/06/2024 23  23 - 29 mmol/L Final    Glucose 11/06/2024 99  70 - 110 mg/dL Final    BUN 11/06/2024 9  6 - 20 mg/dL Final    Creatinine 11/06/2024 0.9  0.5 - 1.4 mg/dL Final    Calcium 11/06/2024 9.5  8.7 - 10.5 mg/dL Final    Total Protein 11/06/2024 7.8  6.0 - 8.4 g/dL Final    Albumin 11/06/2024 3.9  3.5 - 5.2 g/dL Final    Total Bilirubin 11/06/2024 0.8  0.1 - 1.0 mg/dL Final    Comment: For infants and newborns, interpretation of results should be based  on gestational age, weight and in agreement with clinical  observations.    Premature Infant recommended reference ranges:  Up to 24 hours.............<8.0 mg/dL  Up to 48 hours............<12.0 mg/dL  3-5 days..................<15.0 mg/dL  6-29 days.................<15.0 mg/dL      Alkaline Phosphatase 11/06/2024 69  40 - 150 U/L Final    AST 11/06/2024 24  10 - 40 U/L Final    ALT 11/06/2024 20  10 - 44 U/L Final    eGFR 11/06/2024 >60  >60 mL/min/1.73 m^2 Final    Anion Gap 11/06/2024 10  8 - 16 mmol/L Final    Troponin I 11/06/2024 <0.006  0.000 - 0.026 ng/mL Final    Comment: The reference interval for Troponin I represents the 99th percentile   cutoff   for our facility and is consistent with 3rd generation assay   performance.      BNP 11/06/2024 38  0 - 99 pg/mL Final    Values of less than 100 pg/ml are consistent  with non-CHF populations.    Alcohol, Serum 11/06/2024 <10  <10 mg/dL Final    Benzodiazepines 11/06/2024 Negative  Negative Final    Methadone metabolites 11/06/2024 Negative  Negative Final    Cocaine (Metab.) 11/06/2024 Negative  Negative Final    Opiate Scrn, Ur 11/06/2024 Negative  Negative Final    Barbiturate Screen, Ur 11/06/2024 Negative  Negative Final    Amphetamine Screen, Ur 11/06/2024 Negative  Negative Final    THC 11/06/2024 Presumptive Positive (A)  Negative Final    Phencyclidine 11/06/2024 Negative  Negative Final    Creatinine, Urine 11/06/2024 188.4  15.0 - 325.0 mg/dL Final    Toxicology Information 11/06/2024 SEE COMMENT   Final    Comment: This screen includes the following classes of drugs at the listed   cut-off:    Benzodiazepines 200 ng/ml  Methadone 300 ng/ml  Cocaine metabolite 300 ng/ml  Opiates 300 ng/ml  Barbiturates 200 ng/ml  Amphetamines 1000 ng/ml  Marijuana metabs (THC) 50 ng/ml  Phencyclidine (PCP) 25 ng/ml    This is a screening test. If results do not correlate with clinical   presentation, then a confirmatory send out test is advised.     This report is intended for use in clinical monitoring and management   of   patients. It is not intended for use in employment related drug   testing.      Specimen UA 11/06/2024 Urine, Clean Catch   Final    Color, UA 11/06/2024 Orange (A)  Yellow, Straw, Kari Final    Appearance, UA 11/06/2024 Hazy (A)  Clear Final    pH, UA 11/06/2024 6.0  5.0 - 8.0 Final    Specific Gravity, UA 11/06/2024 1.015  1.005 - 1.030 Final    Protein, UA 11/06/2024 1+ (A)  Negative Final    Comment: Recommend a 24 hour urine protein or a urine   protein/creatinine ratio if globulin induced proteinuria is  clinically suspected.      Glucose, UA 11/06/2024 Negative  Negative Final    Ketones, UA 11/06/2024 Negative  Negative Final    Bilirubin (UA) 11/06/2024 Negative  Negative Final    Occult Blood UA 11/06/2024 3+ (A)  Negative Final    Nitrite, UA 11/06/2024  Negative  Negative Final    Urobilinogen, UA 11/06/2024 Negative  <2.0 EU/dL Final    Leukocytes, UA 11/06/2024 1+ (A)  Negative Final    TSH 11/06/2024 0.649  0.400 - 4.000 uIU/mL Final    Alcohol, Serum 11/06/2024 <10  <10 mg/dL Final    Acetaminophen (Tylenol), Serum 11/06/2024 <3.0 (L)  10.0 - 20.0 ug/mL Final    Comment: Toxic Levels:  Adults (4 hr post-ingestion).........>150 ug/mL  Adults (12 hr post-ingestion)........>40 ug/mL  Peds (2 hr post-ingestion, liquid)...>225 ug/mL      RBC, UA 11/06/2024 >100 (H)  0 - 4 /hpf Final    WBC, UA 11/06/2024 1  0 - 5 /hpf Final    Bacteria 11/06/2024 Rare  None-Occ /hpf Final    Squam Epithel, UA 11/06/2024 8  /hpf Final    Hyaline Casts, UA 11/06/2024 none  0-1/lpf /lpf Final    Microscopic Comment 11/06/2024 SEE COMMENT   Final    Comment: Other formed elements not mentioned in the report are not   present in the microscopic examination.       POC PH 11/06/2024 7.386  7.35 - 7.45 Final    POC PCO2 11/06/2024 42.4  35 - 45 mmHg Final    POC PO2 11/06/2024 64 (HH)  40 - 60 mmHg Final    POC HCO3 11/06/2024 25.4  24 - 28 mmol/L Final    POC BE 11/06/2024 0  -2 to 2 mmol/L Final    POC SATURATED O2 11/06/2024 92  95 - 100 % Final    POC TCO2 11/06/2024 27  24 - 29 mmol/L Final    Sample 11/06/2024 VENOUS   Final    Site 11/06/2024 Other   Final    Allens Test 11/06/2024 N/A   Final        Imaging Results    None              Scribe Attestation:   Scribe #1: I performed the above scribed service and the documentation accurately describes the services I performed. I attest to the accuracy of the note.        ED Course as of 11/07/24 0815   Thu Nov 07, 2024   0735 Tele psych consult placed for evaluation and recommendations concerning patient having active hallucinations and significant grief reaction [RF]   0875 Patient's daughter arrived in the ED. states she has been a nurse for 25 years.  Reports this behavior and hallucinations are normal for her mother.  As patient was  just medically cleared from [facility] ED and discharged home she wants to take her home immediately so that she can go to her son's .  All care in the ER was declined.  We discussed risks of death, disability, worsening condition, worsening symptoms.  Patient and her daughter have both refused further care.  I am concerned for patient's health however daughter states she will take responsibility for her and wants to get her to the .  Reports it will bring her back to the emergency department if needed immediately.  However the daughter states that this is her mother's baseline. [RF]      ED Course User Index  [RF] Stella Mathew DO                          I, Dr. Stella Mathew, personally performed the services described in this documentation. This document was produced by a scribe under my direction and in my presence. All medical record entries made by the scribe were at my direction and in my presence.  I have reviewed the chart and agree that the record reflects my personal performance and is accurate and complete. Stella Mathew DO.     2024 8:11 AM       Clinical Impression:  Final diagnoses:  [I10] Hypertension  [R44.3] Hallucinations (Primary)  [F43.21] Grief reaction          ED Disposition Condition    AMA Stable                Stella Mathew,   24 0815

## 2024-11-07 NOTE — ED NOTES
"Pts daughter to bedside and now speaking with patient. Daughter states to RN writer that pt was just seen at [facility] -  and medically cleared. "This isn't necessary. I'll take her home." Pt in agreement with daughter and states that she wishes to sign out in order to attend her son's  today.    MD to bedside for AMA discussion.  "

## 2025-05-10 ENCOUNTER — HOSPITAL ENCOUNTER (EMERGENCY)
Facility: HOSPITAL | Age: 55
Discharge: HOME | End: 2025-05-10
Attending: STUDENT IN AN ORGANIZED HEALTH CARE EDUCATION/TRAINING PROGRAM
Payer: MEDICARE

## 2025-05-10 VITALS
HEIGHT: 64 IN | TEMPERATURE: 98 F | HEART RATE: 79 BPM | WEIGHT: 277 LBS | BODY MASS INDEX: 47.29 KG/M2 | OXYGEN SATURATION: 100 % | RESPIRATION RATE: 20 BRPM | DIASTOLIC BLOOD PRESSURE: 81 MMHG | SYSTOLIC BLOOD PRESSURE: 148 MMHG

## 2025-05-10 DIAGNOSIS — M25.569 KNEE PAIN: ICD-10-CM

## 2025-05-10 PROCEDURE — 96372 THER/PROPH/DIAG INJ SC/IM: CPT | Performed by: STUDENT IN AN ORGANIZED HEALTH CARE EDUCATION/TRAINING PROGRAM

## 2025-05-10 PROCEDURE — 99284 EMERGENCY DEPT VISIT MOD MDM: CPT | Mod: 25,ER

## 2025-05-10 PROCEDURE — 63600175 PHARM REV CODE 636 W HCPCS: Mod: JZ,TB,ER | Performed by: STUDENT IN AN ORGANIZED HEALTH CARE EDUCATION/TRAINING PROGRAM

## 2025-05-10 RX ORDER — KETOROLAC TROMETHAMINE 30 MG/ML
15 INJECTION, SOLUTION INTRAMUSCULAR; INTRAVENOUS
Status: COMPLETED | OUTPATIENT
Start: 2025-05-10 | End: 2025-05-10

## 2025-05-10 RX ADMIN — KETOROLAC TROMETHAMINE 15 MG: 30 INJECTION, SOLUTION INTRAMUSCULAR; INTRAVENOUS at 12:05

## 2025-05-10 NOTE — ED PROVIDER NOTES
"Encounter Date: 5/10/2025       History     Chief Complaint   Patient presents with    Knee Pain     Pt reports pain and swelling on right knee started today and getting worsen, took tylenol with minimal relief. Pt states "fall on 04/19, it wasn't bad"     HPI    55-year-old female who notes and past medical history presents to ED for evaluation of right knee pain that she noticed this morning with that has been getting worse throughout the day.  She notes she was at a concert and was getting up and down frequently he notes that this aggravated her knee pain.  She does not recall its specific inciting incident.  She notes it is worse with the ambulation.  She notes she had a fall several months ago but no recent trauma.  No history of gout.  She denies any fevers, chills, nausea, vomiting.    Review of patient's allergies indicates:  No Known Allergies  Past Medical History:   Diagnosis Date    Depression     Hypertension      Past Surgical History:   Procedure Laterality Date    KNEE SURGERY      NASAL SEPTUM SURGERY      TONSILLECTOMY       Family History   Problem Relation Name Age of Onset    Breast cancer Mother      Colon cancer Neg Hx      Ovarian cancer Neg Hx       Social History[1]  Review of Systems   Constitutional:  Negative for fever.   HENT:  Negative for sore throat.    Respiratory:  Negative for shortness of breath.    Cardiovascular:  Negative for chest pain.   Gastrointestinal:  Negative for nausea.   Genitourinary:  Negative for dysuria.   Musculoskeletal:  Negative for back pain.        (+) right knee pain   Skin:  Negative for rash.   Neurological:  Negative for weakness.   Hematological:  Does not bruise/bleed easily.       Physical Exam     Initial Vitals [05/10/25 0028]   BP Pulse Resp Temp SpO2   (!) 148/81 79 20 98 °F (36.7 °C) 100 %      MAP       --         Physical Exam    Constitutional: Vital signs are normal. She appears well-developed and well-nourished.  Non-toxic appearance. She " does not have a sickly appearance. She does not appear ill.   HENT:   Head: Normocephalic and atraumatic. Mouth/Throat: Mucous membranes are normal.   Eyes: EOM are normal.   Neck: Neck supple.   Cardiovascular:  Normal rate and regular rhythm.           Pulmonary/Chest: No respiratory distress.   Abdominal: Abdomen is soft. Bowel sounds are normal. There is no abdominal tenderness.   Musculoskeletal:      Cervical back: Neck supple.      Comments: She has full active and passive range of motion of the right knee; perhaps very mild joint effusion; no focal tenderness; no significant swelling or erythema  She is able to ambulate with a limp     Neurological: She is alert.   Skin: Skin is warm and dry. No rash noted.   Psychiatric: She has a normal mood and affect.         ED Course   Procedures  Labs Reviewed - No data to display       Imaging Results              X-Ray Knee 3 View Right (In process)                      Medications   ketorolac injection 15 mg (15 mg Intramuscular Given 5/10/25 0050)     Medical Decision Making  Amount and/or Complexity of Data Reviewed  Radiology: ordered.    Risk  Prescription drug management.                     Vitals unremarkable   Patient is well-appearing in no acute distress  Considered but doubt septic joint or gout  Suspect a mild musculoskeletal injury  Given 15 mg of Toradol IM   X-ray of the right knee does appear to show some joint space narrowing in the medial aspect; I do not see any obvious fracture   Patient's pain may be due to arthritis versus sprain/strain or other soft tissue injury  Recommended a short course of Motrin and Tylenol and rice therapy   Advised to follow up with the regular physician in the next 1-2 weeks for pain does not improving   Patient is stable for discharge    I discussed with the patient/family the diagnosis, treatment plan, indications for return to the emergency department, and for expected follow-up. The patient/family verbalized an  understanding. The patient/family is asked if there are any questions or concerns. We discuss the case, until all issues are addressed to the patient/familys satisfaction. Patient/family understands and is agreeable to the plan.   Chico Mejia    DISCLAIMER: This note was prepared with [employer] voice recognition transcription software.                  Clinical Impression:  Final diagnoses:  [M25.569] Knee pain          ED Disposition Condition    Discharge Stable          ED Prescriptions    None       Follow-up Information    None            Chico Mejia MD  05/10/25 0114         [1]   Social History  Tobacco Use    Smoking status: Never    Smokeless tobacco: Never   Substance Use Topics    Drug use: Yes     Types: Other-see comments     Comment: Chico Cramer MD  05/10/25 0114

## 2025-05-10 NOTE — DISCHARGE INSTRUCTIONS
You can alternate 400 of Motrin and a 1000 mg of Tylenol every 4 hours for pain.  You should also do RICE therapy:  Rest, ice, compression, elevation.  Follow up with the regular physician if your deep persist beyond 10-14 days.  Thank you.

## 2025-05-10 NOTE — ED TRIAGE NOTES
Patient presents to the ED with complaints of having right knee pain with stiffness that started tonight. Patient states pain started before going to a concert tonight and the standing up and sitting down of the event tonight made pain worse. Reports having had a previous fall x 1 month ago, but denies any new trauma or fall. Pain treated with tylenol last night with minimal relief. Reports pain worsens when bending her knee or putting pressure on it when walking. Denies any tingling and numbness.     Review of patient's allergies indicates:  No Known Allergies

## 2025-07-04 ENCOUNTER — HOSPITAL ENCOUNTER (EMERGENCY)
Facility: HOSPITAL | Age: 55
Discharge: HOME | End: 2025-07-04
Attending: EMERGENCY MEDICINE
Payer: MEDICARE

## 2025-07-04 VITALS
SYSTOLIC BLOOD PRESSURE: 107 MMHG | BODY MASS INDEX: 47.29 KG/M2 | HEART RATE: 74 BPM | HEIGHT: 64 IN | WEIGHT: 277 LBS | RESPIRATION RATE: 18 BRPM | TEMPERATURE: 98 F | OXYGEN SATURATION: 96 % | DIASTOLIC BLOOD PRESSURE: 70 MMHG
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
## 2025-07-04 PROBLEM — Z95.810 ICD (IMPLANTABLE CARDIOVERTER-DEFIBRILLATOR) IN PLACE: Status: ACTIVE | Noted: 2020-11-19

## 2025-07-04 PROBLEM — D86.85 CARDIAC SARCOIDOSIS: Status: ACTIVE | Noted: 2021-11-18

## 2025-07-04 PROBLEM — D50.9 IRON DEFICIENCY ANEMIA: Status: ACTIVE | Noted: 2019-01-31

## 2025-07-04 PROBLEM — D86.9 SARCOIDOSIS: Status: ACTIVE | Noted: 2020-03-05

## 2025-07-04 PROBLEM — I50.20 HFREF (HEART FAILURE WITH REDUCED EJECTION FRACTION): Status: ACTIVE | Noted: 2021-11-18

## 2025-07-04 PROBLEM — I50.22 CHRONIC SYSTOLIC HEART FAILURE: Status: ACTIVE | Noted: 2019-11-26

## 2025-07-04 PROBLEM — I10 ESSENTIAL HYPERTENSION: Status: ACTIVE | Noted: 2019-01-31

## 2025-07-04 LAB
B-HCG UR QL: NEGATIVE
BILIRUB UR QL STRIP.AUTO: NEGATIVE
CLARITY UR: ABNORMAL
COLOR UR AUTO: YELLOW
CTP QC/QA: YES
GLUCOSE UR QL STRIP: NEGATIVE
HGB UR QL STRIP: NEGATIVE
HYALINE CASTS UR QL AUTO: 3 /LPF (ref 0–1)
KETONES UR QL STRIP: NEGATIVE
LEUKOCYTE ESTERASE UR QL STRIP: ABNORMAL
MICROSCOPIC COMMENT: ABNORMAL
NITRITE UR QL STRIP: NEGATIVE
PH UR STRIP: 6 [PH]
PROT UR QL STRIP: ABNORMAL
RBC #/AREA URNS AUTO: 6 /HPF (ref 0–4)
SP GR UR STRIP: 1.02
SQUAMOUS #/AREA URNS AUTO: 8 /HPF
UROBILINOGEN UR STRIP-ACNC: >=8 EU/DL
WBC #/AREA URNS AUTO: 7 /HPF (ref 0–5)

## 2025-07-04 PROCEDURE — 87086 URINE CULTURE/COLONY COUNT: CPT

## 2025-07-04 PROCEDURE — 99284 EMERGENCY DEPT VISIT MOD MDM: CPT

## 2025-07-04 PROCEDURE — 81025 URINE PREGNANCY TEST: CPT

## 2025-07-04 PROCEDURE — 25000003 PHARM REV CODE 250

## 2025-07-04 PROCEDURE — 81003 URINALYSIS AUTO W/O SCOPE: CPT

## 2025-07-04 RX ORDER — METHOCARBAMOL 500 MG/1
500 TABLET, FILM COATED ORAL 4 TIMES DAILY
Qty: 20 TABLET | Refills: 0 | Status: SHIPPED | OUTPATIENT
Start: 2025-07-04 | End: 2025-07-09

## 2025-07-04 RX ORDER — LIDOCAINE 50 MG/G
1 PATCH TOPICAL
Status: DISCONTINUED | OUTPATIENT
Start: 2025-07-04 | End: 2025-07-04 | Stop reason: HOSPADM

## 2025-07-04 RX ORDER — METHOCARBAMOL 500 MG/1
1000 TABLET, FILM COATED ORAL
Status: COMPLETED | OUTPATIENT
Start: 2025-07-04 | End: 2025-07-04

## 2025-07-04 RX ORDER — LIDOCAINE 50 MG/G
1 PATCH TOPICAL DAILY
Qty: 15 PATCH | Refills: 0 | Status: SHIPPED | OUTPATIENT
Start: 2025-07-04

## 2025-07-04 RX ADMIN — METHOCARBAMOL 1000 MG: 500 TABLET ORAL at 02:07

## 2025-07-04 RX ADMIN — LIDOCAINE 1 PATCH: 50 PATCH TOPICAL at 02:07
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
## 2025-07-05 LAB — HOLD SPECIMEN: NORMAL

## 2025-07-06 LAB — BACTERIA UR CULT: NORMAL
